# Patient Record
Sex: MALE | Race: BLACK OR AFRICAN AMERICAN | NOT HISPANIC OR LATINO
[De-identification: names, ages, dates, MRNs, and addresses within clinical notes are randomized per-mention and may not be internally consistent; named-entity substitution may affect disease eponyms.]

---

## 2019-12-11 ENCOUNTER — TRANSCRIPTION ENCOUNTER (OUTPATIENT)
Age: 54
End: 2019-12-11

## 2019-12-12 ENCOUNTER — EMERGENCY (EMERGENCY)
Facility: HOSPITAL | Age: 54
LOS: 0 days | Discharge: HOME | End: 2019-12-12
Payer: OTHER MISCELLANEOUS

## 2019-12-12 VITALS
SYSTOLIC BLOOD PRESSURE: 136 MMHG | RESPIRATION RATE: 18 BRPM | OXYGEN SATURATION: 100 % | WEIGHT: 173.06 LBS | TEMPERATURE: 98 F | HEART RATE: 90 BPM | DIASTOLIC BLOOD PRESSURE: 93 MMHG | HEIGHT: 66 IN

## 2019-12-12 DIAGNOSIS — S61.411A LACERATION WITHOUT FOREIGN BODY OF RIGHT HAND, INITIAL ENCOUNTER: ICD-10-CM
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
## 2019-12-12 PROCEDURE — 99283 EMERGENCY DEPT VISIT LOW MDM: CPT

## 2019-12-12 PROCEDURE — 73120 X-RAY EXAM OF HAND: CPT | Mod: 26,RT

## 2019-12-12 RX ADMIN — Medication 1 TABLET(S): at 13:52

## 2019-12-12 NOTE — ED PROVIDER NOTE - LOCATION
hand There is a laceration between 3rd and 4th digit however normal range of motion of all fingers/hand/normal extension/flexion/abduction/adduction/normal sensation, strength is 5/5, normal hand , able to make a fist, pulses intact, no other abnormal findings except laceration/hand

## 2019-12-12 NOTE — ED ADULT NURSE NOTE - OBJECTIVE STATEMENT
patient A&Ox3 in no acute distress c/o of laceration R hand 3 th finger , patient injured his finger at work

## 2019-12-12 NOTE — ED PROVIDER NOTE - PATIENT PORTAL LINK FT
You can access the FollowMyHealth Patient Portal offered by [facility] by registering at the following website: [email or web address]. By joining [employer]’s FollowMyHealth portal, you will also be able to view your health information using other applications (apps) compatible with our system.

## 2019-12-12 NOTE — ED ADULT TRIAGE NOTE - CHIEF COMPLAINT QUOTE
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

## 2019-12-12 NOTE — ED PROVIDER NOTE - OBJECTIVE STATEMENT
53 y/o male w/ no PMH presents complaining of an injury to his R hand x 9 AM this morning. 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 Reports that he didn't realize it until later when he took off his glove that he had cut himself. Reports that he didn't sustain any other injury. Feels well otherwise. Had went to urgent care and was told to come here for repair. Feels well otherwise, no other complaints/concerns. Tetanus status is uptodate.    Denies any chest pain, dyspnea, numbness, weakness, fevers, chills, inability to move hand, or any other constitutional sx.    Allergies: NKDA 55 y/o male w/ no PMH presents complaining of an injury to his R hand x 9 AM this morning. 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 Reports that he didn't realize it until later when he took off his glove that he had cut himself. Reports that he didn't sustain any other injury. Feels well otherwise. Had went to urgent care and was told to come here for repair. Feels well otherwise, no other complaints/concerns. Tetanus status is uptodate.    Denies any chest pain, dyspnea, numbness, weakness, fevers, chills, inability to move hand, or any other constitutional sx.    Allergies: NKDA

## 2022-10-06 NOTE — ED ADULT NURSE NOTE - CAS DISCH TRANSFER METHOD
Called Jane and gave verbal orders.    Sharif Yin, BSN RN  [facility]    
Jane RN with [facility] is calling.  Jane is requesting a verbal order for skilled nursing for one time every four weeks and three PRN's.  Please phone Jane at [phone].    
Please approve necessary verbal orders for skilled nursing thank you
Private car

## 2023-04-07 ENCOUNTER — EMERGENCY (EMERGENCY)
Facility: HOSPITAL | Age: 58
LOS: 0 days | Discharge: HOME | End: 2023-04-08
Attending: STUDENT IN AN ORGANIZED HEALTH CARE EDUCATION/TRAINING PROGRAM
Payer: COMMERCIAL

## 2023-04-07 VITALS
HEART RATE: 62 BPM | WEIGHT: 169.98 LBS | RESPIRATION RATE: 18 BRPM | SYSTOLIC BLOOD PRESSURE: 151 MMHG | DIASTOLIC BLOOD PRESSURE: 91 MMHG | OXYGEN SATURATION: 99 % | HEIGHT: 66 IN | TEMPERATURE: 98 F

## 2023-04-07 DIAGNOSIS — M79.89 OTHER SPECIFIED SOFT TISSUE DISORDERS: ICD-10-CM

## 2023-04-07 DIAGNOSIS — Z91.014 ALLERGY TO MAMMALIAN MEATS: ICD-10-CM

## 2023-04-07 DIAGNOSIS — M79.661 PAIN IN RIGHT LOWER LEG: ICD-10-CM

## 2023-04-07 DIAGNOSIS — M71.21 SYNOVIAL CYST OF POPLITEAL SPACE [BAKER], RIGHT KNEE: ICD-10-CM

## 2023-04-07 PROCEDURE — 99284 EMERGENCY DEPT VISIT MOD MDM: CPT

## 2023-04-07 NOTE — ED ADULT TRIAGE NOTE - CHIEF COMPLAINT QUOTE
No PMH  C/o RLE swelling, pain, tenderness since Wednesday. 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 Denies trauma, injury, cp, sob. No PMH  C/o RLE swelling, pain, tenderness since Wednesday. 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 Denies trauma, injury, cp, sob.

## 2023-04-08 VITALS
SYSTOLIC BLOOD PRESSURE: 128 MMHG | HEART RATE: 59 BPM | OXYGEN SATURATION: 98 % | DIASTOLIC BLOOD PRESSURE: 91 MMHG | TEMPERATURE: 99 F | RESPIRATION RATE: 16 BRPM

## 2023-04-08 LAB
ALBUMIN SERPL ELPH-MCNC: 3.7 G/DL — SIGNIFICANT CHANGE UP (ref 3.3–5)
ALP SERPL-CCNC: 56 U/L — SIGNIFICANT CHANGE UP (ref 40–120)
ALT FLD-CCNC: 28 U/L — SIGNIFICANT CHANGE UP (ref 12–78)
ANION GAP SERPL CALC-SCNC: 3 MMOL/L — LOW (ref 5–17)
APTT BLD: 32.2 SEC — SIGNIFICANT CHANGE UP (ref 27.5–35.5)
AST SERPL-CCNC: 18 U/L — SIGNIFICANT CHANGE UP (ref 15–37)
BASOPHILS # BLD AUTO: 0.03 K/UL — SIGNIFICANT CHANGE UP (ref 0–0.2)
BASOPHILS NFR BLD AUTO: 0.5 % — SIGNIFICANT CHANGE UP (ref 0–2)
BILIRUB SERPL-MCNC: 0.3 MG/DL — SIGNIFICANT CHANGE UP (ref 0.2–1.2)
BUN SERPL-MCNC: 17 MG/DL — SIGNIFICANT CHANGE UP (ref 7–23)
CALCIUM SERPL-MCNC: 9.6 MG/DL — SIGNIFICANT CHANGE UP (ref 8.5–10.1)
CHLORIDE SERPL-SCNC: 106 MMOL/L — SIGNIFICANT CHANGE UP (ref 96–108)
CO2 SERPL-SCNC: 31 MMOL/L — SIGNIFICANT CHANGE UP (ref 22–31)
CREAT SERPL-MCNC: 1.19 MG/DL — SIGNIFICANT CHANGE UP (ref 0.5–1.3)
EGFR: 72 ML/MIN/1.73M2 — SIGNIFICANT CHANGE UP
EOSINOPHIL # BLD AUTO: 0.08 K/UL — SIGNIFICANT CHANGE UP (ref 0–0.5)
EOSINOPHIL NFR BLD AUTO: 1.4 % — SIGNIFICANT CHANGE UP (ref 0–6)
GLUCOSE SERPL-MCNC: 102 MG/DL — HIGH (ref 70–99)
HCT VFR BLD CALC: 37.5 % — LOW (ref 39–50)
HGB BLD-MCNC: 12.8 G/DL — LOW (ref 13–17)
IMM GRANULOCYTES NFR BLD AUTO: 0.2 % — SIGNIFICANT CHANGE UP (ref 0–0.9)
INR BLD: 1.03 RATIO — SIGNIFICANT CHANGE UP (ref 0.88–1.16)
LYMPHOCYTES # BLD AUTO: 1.63 K/UL — SIGNIFICANT CHANGE UP (ref 1–3.3)
LYMPHOCYTES # BLD AUTO: 27.6 % — SIGNIFICANT CHANGE UP (ref 13–44)
MCHC RBC-ENTMCNC: 31.1 PG — SIGNIFICANT CHANGE UP (ref 27–34)
MCHC RBC-ENTMCNC: 34.1 G/DL — SIGNIFICANT CHANGE UP (ref 32–36)
MCV RBC AUTO: 91.2 FL — SIGNIFICANT CHANGE UP (ref 80–100)
MONOCYTES # BLD AUTO: 0.49 K/UL — SIGNIFICANT CHANGE UP (ref 0–0.9)
MONOCYTES NFR BLD AUTO: 8.3 % — SIGNIFICANT CHANGE UP (ref 2–14)
NEUTROPHILS # BLD AUTO: 3.66 K/UL — SIGNIFICANT CHANGE UP (ref 1.8–7.4)
NEUTROPHILS NFR BLD AUTO: 62 % — SIGNIFICANT CHANGE UP (ref 43–77)
NRBC # BLD: 0 /100 WBCS — SIGNIFICANT CHANGE UP (ref 0–0)
PLATELET # BLD AUTO: 207 K/UL — SIGNIFICANT CHANGE UP (ref 150–400)
POTASSIUM SERPL-MCNC: 3.9 MMOL/L — SIGNIFICANT CHANGE UP (ref 3.5–5.3)
POTASSIUM SERPL-SCNC: 3.9 MMOL/L — SIGNIFICANT CHANGE UP (ref 3.5–5.3)
PROT SERPL-MCNC: 6.9 GM/DL — SIGNIFICANT CHANGE UP (ref 6–8.3)
PROTHROM AB SERPL-ACNC: 12.4 SEC — SIGNIFICANT CHANGE UP (ref 10.5–13.4)
RBC # BLD: 4.11 M/UL — LOW (ref 4.2–5.8)
RBC # FLD: 12.6 % — SIGNIFICANT CHANGE UP (ref 10.3–14.5)
SODIUM SERPL-SCNC: 140 MMOL/L — SIGNIFICANT CHANGE UP (ref 135–145)
WBC # BLD: 5.9 K/UL — SIGNIFICANT CHANGE UP (ref 3.8–10.5)
WBC # FLD AUTO: 5.9 K/UL — SIGNIFICANT CHANGE UP (ref 3.8–10.5)

## 2023-04-08 PROCEDURE — 93971 EXTREMITY STUDY: CPT | Mod: 26,RT

## 2023-04-08 NOTE — ED ADULT NURSE NOTE - CHIEF COMPLAINT QUOTE
No PMH  C/o RLE swelling, pain, tenderness since Wednesday. 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 Denies trauma, injury, cp, sob.
Implemented All Fall Risk Interventions:  [place] to call system. Call bell, personal items and telephone within reach. Instruct patient to call for assistance. Room bathroom lighting operational. Non-slip footwear when patient is off stretcher. Physically safe environment: no spills, clutter or unnecessary equipment. Stretcher in lowest position, wheels locked, appropriate side rails in place. Provide visual cue, wrist band, yellow gown, etc. Monitor gait and stability. Monitor for mental status changes and reorient to person, place, and time. Review medications for side effects contributing to fall risk. Reinforce activity limits and safety measures with patient and family.

## 2023-04-08 NOTE — ED PROVIDER NOTE - PROGRESS NOTE DETAILS
Anshul: Received sign out pending sono read - bakers cyst.  D/w patient and plan for dc.  Given elastic bandage and will dc.  Answered questions.

## 2023-04-08 NOTE — ED PROVIDER NOTE - CLINICAL SUMMARY MEDICAL DECISION MAKING FREE TEXT BOX
Sai, DO: 57F p/w atraumatic R calf pain/swelling x 4 days. R calf swelling with mild TTP but no e/o infection. NVI. DDx includes but not limited to DVT, Baker's cyst, calf strain. No e/o achilles tendon rupture on exam. No e/o celllulitis/abscess. Do not suspect arterial injury. Plan for US duplex RLE, screening labs Sai, DO: 57F p/w atraumatic R calf pain/swelling x 4 days. R calf swelling with mild TTP but no e/o infection. NVI. DDx includes but not limited to DVT, Baker's cyst, calf strain. No e/o achilles tendon rupture on exam. No e/o celllulitis/abscess. Do not suspect arterial injury or compartment syndrome. Plan for US duplex RLE, screening labs Sai, DO: 57M p/w atraumatic R calf pain/swelling x 4 days. R calf swelling with mild TTP but no e/o infection. NVI. DDx includes but not limited to DVT, Baker's cyst, calf strain. No e/o achilles tendon rupture on exam. No e/o celllulitis/abscess. Do not suspect arterial injury or compartment syndrome. Plan for US duplex RLE, screening labs

## 2023-04-08 NOTE — ED PROVIDER NOTE - PHYSICAL EXAMINATION
I have reviewed the triage vital signs.  Const: AAOx3, in NAD  Eyes: no conjunctival injection  HENT: NCAT  CV: RRR, +S1, S2  Resp: CTAB, no respiratory distress  GI: Abdomen soft, NTND, no guarding  : no CVA tenderness  Extremities: No peripheral edema,  2+ radial and DP pulses  Skin: Warm, well perfused, no rash  MSK R calf swelling with mild TTP. R calf has no erythema/ecchymosis/crepitus/rash/wound. Krishnan test normal (normal plantarflexion with calf squeeze). Achilles tendon intact and nontender  Neuro: No focal sensory or motor deficits  Psych: Appropriate mood and affect I have reviewed the triage vital signs.  Const: AAOx3, in NAD  Eyes: no conjunctival injection  HENT: NCAT  CV: RRR, +S1, S2  Resp: CTAB, no respiratory distress  GI: Abdomen soft, NTND, no guarding  : no CVA tenderness  Extremities: No peripheral edema,  2+ radial and DP pulses  Skin: Warm, well perfused, no rash  MSK R calf swelling with mild TTP. R calf has no erythema/ecchymosis/crepitus/rash/wound. Krishnan test normal (normal plantarflexion with calf squeeze). Achilles tendon intact and nontender. Compartments syndrome  Neuro: No focal sensory or motor deficits  Psych: Appropriate mood and affect

## 2023-04-08 NOTE — ED PROVIDER NOTE - OBJECTIVE STATEMENT
57-year-old male no past medical history, non-smoker presents with right calf swelling with mild pain x4 days.  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  Denies any injury to his calf.  No history of DVT/PE, no chest pain or shortness of breath.  Denies any fevers, discoloration to his calf.  Denies any numbness, tingling, weakness.  Has been ambulating without issue.

## 2023-04-08 NOTE — ED ADULT NURSE NOTE - OBJECTIVE STATEMENT
Pt AOx4 and ambulatory with steady gait. Pt c/o RLE swelling that began yesterday. 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 B/l LE equal in temperature at this time and pt denies any numbness/tingling. Pt denies use of pain medication. Pt denies use of blood thinners. Pt denies SOB, fever/chills, sneezing, coughing, dizziness, headache, blurred vision, chest pain, N/V/D, or dysuria. Covering for primary RN Curtis. Pt AOx4 and ambulatory with steady gait. Pt c/o RLE swelling that began yesterday. 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 B/l LE equal in temperature at this time and pt denies any numbness/tingling. Pt denies use of pain medication. Pt denies use of blood thinners. Pt denies SOB, fever/chills, sneezing, coughing, dizziness, headache, blurred vision, chest pain, N/V/D, or dysuria.

## 2023-04-08 NOTE — ED PROVIDER NOTE - PATIENT PORTAL LINK FT
You can access the FollowMyHealth Patient Portal offered by [facility] by registering at the following website: [email or web address]. By joining [employer]’s FollowMyHealth portal, you will also be able to view your health information using other applications (apps) compatible with our system.

## 2023-04-08 NOTE — ED PROVIDER NOTE - NSFOLLOWUPINSTRUCTIONS_ED_ALL_ED_FT
Rest, drink plenty of fluids  Advance activity as tolerated  Continue all previously prescribed medications as directed  Follow up with your PMD - bring copies of your results  Return to the ER for severe pain, redness, fevers, or other new or concerning symptoms   For pain, you may take Tylenol 975mg every six hours and supplement with ibuprofen 600mg, with food, every six hours which can be taken three hours apart from the Tylenol to have a layered effect.